# Patient Record
Sex: FEMALE | Race: WHITE | NOT HISPANIC OR LATINO | Employment: FULL TIME | URBAN - METROPOLITAN AREA
[De-identification: names, ages, dates, MRNs, and addresses within clinical notes are randomized per-mention and may not be internally consistent; named-entity substitution may affect disease eponyms.]

---

## 2024-06-25 ENCOUNTER — PATIENT MESSAGE (OUTPATIENT)
Dept: OBSTETRICS AND GYNECOLOGY | Facility: CLINIC | Age: 24
End: 2024-06-25
Payer: MEDICAID

## 2024-07-02 ENCOUNTER — TELEPHONE (OUTPATIENT)
Dept: OBSTETRICS AND GYNECOLOGY | Facility: CLINIC | Age: 24
End: 2024-07-02
Payer: MEDICAID

## 2024-07-02 RX ORDER — CLOMIPHENE CITRATE 50 MG/1
50 TABLET ORAL DAILY
Qty: 5 TABLET | Refills: 0 | Status: SHIPPED | OUTPATIENT
Start: 2024-07-02 | End: 2024-07-07

## 2024-07-02 NOTE — TELEPHONE ENCOUNTER
----- Message from Nakita Beltran MA sent at 7/2/2024  8:02 AM CDT -----  Contact: clinic voicemail  Reed Mejia  MRN: [id number]  Home Phone      [phone]  Work Phone      Not on file.  Mobile          [phone]    Patient Care Team:  Nava Oconnor NP as PCP - General (Family Medicine)  Darling Vora NP as Nurse Practitioner (Pulmonary Disease)  Cristhian Ely MD as Obstetrician (Obstetrics and Gynecology)  Judy Marsh MA as Care Coordinator  OB? No  What phone number can you be reached at? [phone]  Message: Needs to speak to nurse regarding getting an increase on clomid.

## 2024-07-02 NOTE — TELEPHONE ENCOUNTER
Patient requesting a refill on fertility medication.     Fertility medication started: Femara 2.5 mg on 10/18/23  Cycle day #1: 7/1/2024  Cycle day #21 progesterone level due: 7/21/24 (angelito Jo on Mon 7/22/24)  Last cycle medication: Clomid 50 mg   Last progesterone level:  Pt did not have lab done with this cycle to confirm if she ovulated but reports she did not have a positive home OPK.

## 2024-07-21 ENCOUNTER — LAB VISIT (OUTPATIENT)
Dept: LAB | Facility: HOSPITAL | Age: 24
End: 2024-07-21
Attending: OBSTETRICS & GYNECOLOGY
Payer: COMMERCIAL

## 2024-07-21 DIAGNOSIS — N92.6 IRREGULAR MENSES: ICD-10-CM

## 2024-07-21 LAB — PROGEST SERPL-MCNC: 0.3 NG/ML

## 2024-07-21 PROCEDURE — 36415 COLL VENOUS BLD VENIPUNCTURE: CPT | Performed by: OBSTETRICS & GYNECOLOGY

## 2024-07-21 PROCEDURE — 84144 ASSAY OF PROGESTERONE: CPT | Performed by: OBSTETRICS & GYNECOLOGY

## 2024-08-05 ENCOUNTER — TELEPHONE (OUTPATIENT)
Dept: OBSTETRICS AND GYNECOLOGY | Facility: CLINIC | Age: 24
End: 2024-08-05
Payer: COMMERCIAL

## 2024-08-08 RX ORDER — CLOMIPHENE CITRATE 50 MG/1
TABLET ORAL
Qty: 10 TABLET | Refills: 0 | Status: SHIPPED | OUTPATIENT
Start: 2024-08-08

## 2024-08-09 ENCOUNTER — TELEPHONE (OUTPATIENT)
Dept: OBSTETRICS AND GYNECOLOGY | Facility: CLINIC | Age: 24
End: 2024-08-09
Payer: COMMERCIAL

## 2024-08-09 NOTE — TELEPHONE ENCOUNTER
----- Message from Nakita Beltran MA sent at 8/9/2024  3:30 PM CDT -----  Contact: self  Reed Angulo  MRN: [id number]  Home Phone      [phone]  Work Phone      Not on file.  Mobile          [phone]    Patient Care Team:  Nava Oconnor NP as PCP - General (Family Medicine)  Darling Vora NP as Nurse Practitioner (Pulmonary Disease)  Cristhian Ely MD as Obstetrician (Obstetrics and Gynecology)  Judy Marsh MA as Care Coordinator  Marisela Aldana MD as Consulting Physician (Endocrinology)  Iqra Person MD as Consulting Physician (Obstetrics and Gynecology)  OB? No  What phone number can you be reached at? [phone]  Message: Would like to speak to nurse regarding still hasn't started cycle yet and clomid medication.

## 2024-08-15 ENCOUNTER — PATIENT MESSAGE (OUTPATIENT)
Dept: OBSTETRICS AND GYNECOLOGY | Facility: CLINIC | Age: 24
End: 2024-08-15

## 2024-08-15 ENCOUNTER — OFFICE VISIT (OUTPATIENT)
Dept: OBSTETRICS AND GYNECOLOGY | Facility: CLINIC | Age: 24
End: 2024-08-15
Payer: COMMERCIAL

## 2024-08-15 ENCOUNTER — LAB VISIT (OUTPATIENT)
Dept: LAB | Facility: HOSPITAL | Age: 24
End: 2024-08-15
Attending: OBSTETRICS & GYNECOLOGY
Payer: COMMERCIAL

## 2024-08-15 VITALS
HEART RATE: 91 BPM | WEIGHT: 210.19 LBS | SYSTOLIC BLOOD PRESSURE: 116 MMHG | OXYGEN SATURATION: 100 % | HEIGHT: 64 IN | RESPIRATION RATE: 18 BRPM | DIASTOLIC BLOOD PRESSURE: 70 MMHG | BODY MASS INDEX: 35.88 KG/M2

## 2024-08-15 DIAGNOSIS — Z12.4 SCREENING FOR MALIGNANT NEOPLASM OF CERVIX: ICD-10-CM

## 2024-08-15 DIAGNOSIS — E28.2 PCOS (POLYCYSTIC OVARIAN SYNDROME): ICD-10-CM

## 2024-08-15 DIAGNOSIS — N97.0 ANOVULATION: ICD-10-CM

## 2024-08-15 DIAGNOSIS — N92.6 IRREGULAR MENSES: ICD-10-CM

## 2024-08-15 DIAGNOSIS — Z01.419 WELL WOMAN EXAM WITH ROUTINE GYNECOLOGICAL EXAM: Primary | ICD-10-CM

## 2024-08-15 LAB
ESTRADIOL SERPL-MCNC: 24 PG/ML
FSH SERPL-ACNC: 11.35 MIU/ML

## 2024-08-15 PROCEDURE — 99999 PR PBB SHADOW E&M-EST. PATIENT-LVL III: CPT | Mod: PBBFAC,,, | Performed by: OBSTETRICS & GYNECOLOGY

## 2024-08-15 PROCEDURE — 3008F BODY MASS INDEX DOCD: CPT | Mod: CPTII,S$GLB,, | Performed by: OBSTETRICS & GYNECOLOGY

## 2024-08-15 PROCEDURE — 83001 ASSAY OF GONADOTROPIN (FSH): CPT | Performed by: OBSTETRICS & GYNECOLOGY

## 2024-08-15 PROCEDURE — 3078F DIAST BP <80 MM HG: CPT | Mod: CPTII,S$GLB,, | Performed by: OBSTETRICS & GYNECOLOGY

## 2024-08-15 PROCEDURE — 82670 ASSAY OF TOTAL ESTRADIOL: CPT | Performed by: OBSTETRICS & GYNECOLOGY

## 2024-08-15 PROCEDURE — 82166 ASSAY ANTI-MULLERIAN HORM: CPT | Performed by: OBSTETRICS & GYNECOLOGY

## 2024-08-15 PROCEDURE — 36415 COLL VENOUS BLD VENIPUNCTURE: CPT | Performed by: OBSTETRICS & GYNECOLOGY

## 2024-08-15 PROCEDURE — 99395 PREV VISIT EST AGE 18-39: CPT | Mod: S$GLB,,, | Performed by: OBSTETRICS & GYNECOLOGY

## 2024-08-15 PROCEDURE — 1159F MED LIST DOCD IN RCRD: CPT | Mod: CPTII,S$GLB,, | Performed by: OBSTETRICS & GYNECOLOGY

## 2024-08-15 PROCEDURE — 3044F HG A1C LEVEL LT 7.0%: CPT | Mod: CPTII,S$GLB,, | Performed by: OBSTETRICS & GYNECOLOGY

## 2024-08-15 PROCEDURE — 3074F SYST BP LT 130 MM HG: CPT | Mod: CPTII,S$GLB,, | Performed by: OBSTETRICS & GYNECOLOGY

## 2024-08-15 NOTE — PROGRESS NOTES
Subjective:    Patient ID: Reed Mejia is a 24 y.o. y.o. female.     Chief Complaint: Annual Well Woman Exam     History of Present Illness:  Reed presents today for Annual Well Woman exam. She describes her menses as  irregular; takes cyclic provera .She denies pelvic pain.  She denies breast tenderness, masses, nipple discharge. She denies difficulty with urination or bowel movements. She denies bloating, early satiety, or weight changes. She is sexually active. Contraception is by no method.    Patient previously on fertility medication for anovulatory cycles. We are pausing at the moment; will attempt to have some weight loss prior to resuming.     The following portions of the patient's history were reviewed and updated as appropriate: allergies, current medications, past family history, past medical history, past social history, past surgical history and problem list.      Menstrual History:   Patient's last menstrual period was 2024 (exact date)..     OB History          0    Para   0    Term   0       0    AB   0    Living   0         SAB   0    IAB   0    Ectopic   0    Multiple   0    Live Births   0                 The following portions of the patient's history were reviewed and updated as appropriate: allergies, current medications, past family history, past medical history, past social history, past surgical history, and problem list.        ROS:     Review of Systems   Constitutional:  Negative for activity change, appetite change, chills, diaphoresis, fatigue, fever and unexpected weight change.   HENT:  Negative for mouth sores and tinnitus.    Eyes:  Negative for discharge and visual disturbance.   Respiratory:  Negative for cough, shortness of breath and wheezing.    Cardiovascular:  Negative for chest pain, palpitations and leg swelling.   Gastrointestinal:  Positive for diarrhea. Negative for abdominal pain, bloating, blood in stool, constipation, nausea and  "vomiting.   Endocrine: Positive for hot flashes. Negative for diabetes, hair loss, hyperthyroidism and hypothyroidism.   Genitourinary:  Negative for dysuria, flank pain, frequency, genital sores, hematuria, urgency, vaginal bleeding, vaginal discharge, vaginal pain, postcoital bleeding and vaginal odor.   Musculoskeletal:  Negative for back pain, joint swelling and myalgias.   Integumentary:  Negative for rash, acne, breast mass, nipple discharge and breast skin changes.   Neurological:  Negative for seizures, syncope, numbness and headaches.   Hematological:  Negative for adenopathy. Does not bruise/bleed easily.   Psychiatric/Behavioral:  Negative for depression and sleep disturbance. The patient is not nervous/anxious.    Breast: Negative for mass, mastodynia, nipple discharge and skin changes      Objective:    Vital Signs:  Vitals:    08/15/24 1152   BP: 116/70   BP Location: Left arm   Patient Position: Sitting   BP Method: Medium (Manual)   Pulse: 91   Resp: 18   SpO2: 100%   Weight: 95.4 kg (210 lb 3.3 oz)   Height: 5' 4" (1.626 m)         Physical Exam:  General:  alert, cooperative, appears stated age   Skin:  Skin color, texture, turgor normal. No rashes or lesions   HEENT:  conjunctivae/corneas clear. PERRL.   Neck: supple, trachea midline, no adenopathy or thyromegally   Respiratory:  clear to auscultation bilaterally   Heart:  regular rate and rhythm, S1, S2 normal, no murmur, click, rub or gallop   Breasts:  no discharge, erythema, or tenderness   Abdomen:  normal findings: bowel sounds normal, no masses palpable, no organomegaly and soft, non-tender   Pelvis: External genitalia: normal general appearance  Urinary system: urethral meatus normal, bladder nontender  Vaginal: normal mucosa without prolapse or lesions  Cervix: normal appearance  Uterus: normal size, shape, position  Adnexa: normal size, nontender bilaterally   Extremities: Normal ROM; no edema, no cyanosis   Neurologial: Normal strength " and tone. No focal numbness or weakness. Reflexes 2+ and equal.   Psychiatric: normal mood, speech, dress, and thought processes         Assessment:       Healthy female exam.     1. Well woman exam with routine gynecological exam    2. Screening for malignant neoplasm of cervix    3. PCOS (polycystic ovarian syndrome)    4. Irregular menses    5. Anovulation    6. BMI 36.0-36.9,adult          Plan:       Thin prep Pap smear.   Rx of Ozempic  Continue cyclic provera  Labs    COUNSELING:  Reed was counseled on STD pevention, use and side-effects of various contraceptive measures, A.C.O.G. Pap guidelines and recommendations for yearly pelvic exams in addition to recommendations for monthly self breast exams; to see her PCP for other health maintenance.

## 2024-08-19 LAB — MIS SERPL-MCNC: 12 NG/ML (ref 0.89–9.9)

## 2024-08-21 ENCOUNTER — PATIENT MESSAGE (OUTPATIENT)
Dept: OBSTETRICS AND GYNECOLOGY | Facility: CLINIC | Age: 24
End: 2024-08-21
Payer: COMMERCIAL

## 2024-08-27 ENCOUNTER — LAB VISIT (OUTPATIENT)
Dept: LAB | Facility: HOSPITAL | Age: 24
End: 2024-08-27
Attending: OBSTETRICS & GYNECOLOGY
Payer: COMMERCIAL

## 2024-08-27 DIAGNOSIS — N92.6 IRREGULAR MENSES: ICD-10-CM

## 2024-08-27 LAB — PROGEST SERPL-MCNC: 0.2 NG/ML

## 2024-08-27 PROCEDURE — 36415 COLL VENOUS BLD VENIPUNCTURE: CPT | Performed by: OBSTETRICS & GYNECOLOGY

## 2024-08-27 PROCEDURE — 84144 ASSAY OF PROGESTERONE: CPT | Performed by: OBSTETRICS & GYNECOLOGY

## 2024-08-27 RX ORDER — MEDROXYPROGESTERONE ACETATE 10 MG/1
TABLET ORAL
Qty: 20 TABLET | Refills: 0 | OUTPATIENT
Start: 2024-08-27

## 2024-08-27 NOTE — TELEPHONE ENCOUNTER
----- Message from Karina Leonard sent at 8/27/2024 11:47 AM CDT -----  Contact: Self  Reed Angulo  MRN: [id number]  Home Phone      Not on file.  Work Phone      Not on file.  Mobile          [phone]    Patient Care Team:  aNva Oconnor NP as PCP - General (Family Medicine)  Darling Vora NP as Nurse Practitioner (Pulmonary Disease)  Cristhian Ely MD as Obstetrician (Obstetrics and Gynecology)  Judy Marsh MA as Care Coordinator  Marisela Aldana MD as Consulting Physician (Endocrinology)  Iqra Person MD as Consulting Physician (Obstetrics and Gynecology)  OB? No  What phone number can you be reached at? [phone]  Message: needs new refill for progesterone

## 2024-08-27 NOTE — TELEPHONE ENCOUNTER
Refill Decision Note   Reed DowneyBlanc  is requesting a refill authorization.  Brief Assessment and Rationale for Refill:  Quick Discontinue     Medication Therapy Plan:  Discontinued by: Iqra Person MD on 8/15/2024      Comments:     Note composed:12:23 PM 08/27/2024

## 2024-08-28 ENCOUNTER — PATIENT MESSAGE (OUTPATIENT)
Dept: OBSTETRICS AND GYNECOLOGY | Facility: CLINIC | Age: 24
End: 2024-08-28
Payer: COMMERCIAL

## 2024-08-28 RX ORDER — MEDROXYPROGESTERONE ACETATE 10 MG/1
10 TABLET ORAL DAILY
Qty: 10 TABLET | Refills: 6 | Status: SHIPPED | OUTPATIENT
Start: 2024-08-28 | End: 2024-09-07

## 2024-08-31 ENCOUNTER — PATIENT MESSAGE (OUTPATIENT)
Dept: OBSTETRICS AND GYNECOLOGY | Facility: CLINIC | Age: 24
End: 2024-08-31
Payer: COMMERCIAL

## 2024-09-03 NOTE — TELEPHONE ENCOUNTER
Pt stated she's still having hot flashes and would like to know what she can do about them, pt states she had labs drawn about 2 wks ago, pls advise.

## 2024-09-13 ENCOUNTER — TELEPHONE (OUTPATIENT)
Dept: OBSTETRICS AND GYNECOLOGY | Facility: CLINIC | Age: 24
End: 2024-09-13
Payer: COMMERCIAL

## 2024-09-13 DIAGNOSIS — N97.0 ANOVULATION: Primary | ICD-10-CM

## 2024-09-13 RX ORDER — CLOMIPHENE CITRATE 50 MG/1
TABLET ORAL
Qty: 10 TABLET | Refills: 0 | Status: CANCELLED | OUTPATIENT
Start: 2024-09-13

## 2024-09-13 NOTE — TELEPHONE ENCOUNTER
Patient requesting a refill on fertility medication.     Fertility medication started: Clomid 50 mg   Cycle day #1: 09/12/2024  Cycle day #21 progesterone level due: 10/02/2024 (scheduled)  Last cycle medication: Clomid 100 mg  Last progesterone level:  0.2 on 8/27/27       Verbal order with read back to call in refill. Refill called into physician voicemail at [facility].   clomiPHENE (CLOMID) 50 mg tablet  10 tablet 0      Sig: Take 2 tablets (total 100 mg) by mouth once daily for 5 days

## 2024-09-13 NOTE — TELEPHONE ENCOUNTER
----- Message from Karina Horacio sent at 9/13/2024  3:33 PM CDT -----  Contact: Self  Reed Angulo  MRN: [id number]  Home Phone      Not on file.  Work Phone      Not on file.  Mobile          [phone]    Patient Care Team:  Nava Oconnor NP as PCP - General (Family Medicine)  Darling Vora NP as Nurse Practitioner (Pulmonary Disease)  Cristhian Ely MD as Obstetrician (Obstetrics and Gynecology)  Judy Marsh MA as Care Coordinator  Marisela Aldana MD as Consulting Physician (Endocrinology)  Iqra Person MD as Consulting Physician (Obstetrics and Gynecology)  OB? No  What phone number can you be reached at? [phone]  Message: pt states she started period yesterday and wants to know if she needs to do another round of Clomid

## 2024-10-02 ENCOUNTER — LAB VISIT (OUTPATIENT)
Dept: LAB | Facility: HOSPITAL | Age: 24
End: 2024-10-02
Attending: OBSTETRICS & GYNECOLOGY
Payer: COMMERCIAL

## 2024-10-02 DIAGNOSIS — N97.0 ANOVULATION: ICD-10-CM

## 2024-10-02 LAB — PROGEST SERPL-MCNC: 1 NG/ML

## 2024-10-02 PROCEDURE — 36415 COLL VENOUS BLD VENIPUNCTURE: CPT | Performed by: OBSTETRICS & GYNECOLOGY

## 2024-10-02 PROCEDURE — 84144 ASSAY OF PROGESTERONE: CPT | Performed by: OBSTETRICS & GYNECOLOGY

## 2024-10-25 ENCOUNTER — PATIENT MESSAGE (OUTPATIENT)
Dept: OBSTETRICS AND GYNECOLOGY | Facility: CLINIC | Age: 24
End: 2024-10-25
Payer: COMMERCIAL

## 2024-10-29 RX ORDER — MEDROXYPROGESTERONE ACETATE 10 MG/1
20 TABLET ORAL DAILY
Qty: 20 TABLET | Refills: 0 | Status: SHIPPED | OUTPATIENT
Start: 2024-10-29 | End: 2024-11-08

## 2024-11-06 ENCOUNTER — PATIENT MESSAGE (OUTPATIENT)
Dept: OBSTETRICS AND GYNECOLOGY | Facility: CLINIC | Age: 24
End: 2024-11-06
Payer: COMMERCIAL

## 2024-11-16 ENCOUNTER — PATIENT MESSAGE (OUTPATIENT)
Dept: OBSTETRICS AND GYNECOLOGY | Facility: CLINIC | Age: 24
End: 2024-11-16
Payer: COMMERCIAL

## 2024-11-19 RX ORDER — MEDROXYPROGESTERONE ACETATE 10 MG/1
20 TABLET ORAL DAILY
Qty: 20 TABLET | Refills: 0 | Status: SHIPPED | OUTPATIENT
Start: 2024-11-19 | End: 2024-11-29

## 2024-11-19 RX ORDER — ESTRADIOL 2 MG/1
2 TABLET ORAL DAILY
Qty: 10 TABLET | Refills: 0 | Status: SHIPPED | OUTPATIENT
Start: 2024-11-19 | End: 2024-11-29

## 2025-01-10 ENCOUNTER — TELEPHONE (OUTPATIENT)
Dept: OBSTETRICS AND GYNECOLOGY | Facility: CLINIC | Age: 25
End: 2025-01-10

## 2025-01-10 NOTE — TELEPHONE ENCOUNTER
Patient states she completed the Estrace and Provera and did not have a cycle. She states they are scheduled to go for a consult with [facility] in [place]. She reports they don't accept her [employer] but they do accept her secondary insurance [facility]. Patient states she contacted [employer] and they told her that a PA can be done. Patient did not get any additional information from [employer] or [facility]. Instructed that if the clinic does not accept her [employer] then doing a PA wouldn't change that they do not accept the insurance. Instructed patient that when I have available time I will try to reach out to [facility] to clarify information. Patient states she is scheduled for 2/18/25 to see them.

## 2025-01-10 NOTE — TELEPHONE ENCOUNTER
----- Message from Nurse Booker sent at 1/9/2025  5:53 PM CST -----  Contact: self    ----- Message -----  From: Nakita Beltran MA  Sent: 1/9/2025  12:35 PM CST  To: [facility] Staff    Reed Angulo  MRN: [id number]  Home Phone      Not on file.  Work Phone      Not on file.  Mobile          [phone]    Patient Care Team:  Nava Oconnor NP as PCP - General (Family Medicine)  Darling Vora NP as Nurse Practitioner (Pulmonary Disease)  Cristhian Ely MD as Obstetrician (Obstetrics and Gynecology)  Marisela Aldana MD as Consulting Physician (Endocrinology)  Iqra Person MD as Consulting Physician (Obstetrics and Gynecology)  OB? No  What phone number can you be reached at? [phone]  Message: Has questions regarding fertility issues still with progesterone medication.

## 2025-02-06 ENCOUNTER — TELEPHONE (OUTPATIENT)
Dept: OBSTETRICS AND GYNECOLOGY | Facility: CLINIC | Age: 25
End: 2025-02-06

## 2025-02-06 NOTE — TELEPHONE ENCOUNTER
----- Message from Karina sent at 2/6/2025 11:14 AM CST -----  Contact: Self  Reed Angulo  MRN: [id number]  Home Phone      Not on file.  Work Phone      Not on file.  Mobile          [phone]    Patient Care Team:  Nava Oconnor NP as PCP - General (Family Medicine)  Darling Vora NP as Nurse Practitioner (Pulmonary Disease)  Cristhian Ely MD as Obstetrician (Obstetrics and Gynecology)  Marisela Aldana MD as Consulting Physician (Endocrinology)  Iqra Person MD as Consulting Physician (Obstetrics and Gynecology)  OB? No  What phone number can you be reached at? [phone]  Message: pt states insurance does not cover fertility clinic - wants to know what would be next step